# Patient Record
Sex: FEMALE | Race: WHITE
[De-identification: names, ages, dates, MRNs, and addresses within clinical notes are randomized per-mention and may not be internally consistent; named-entity substitution may affect disease eponyms.]

---

## 2018-01-15 ENCOUNTER — HOSPITAL ENCOUNTER (OUTPATIENT)
Dept: HOSPITAL 95 - LAB | Age: 53
Discharge: HOME | End: 2018-01-15
Payer: COMMERCIAL

## 2018-01-15 DIAGNOSIS — E11.49: Primary | ICD-10-CM

## 2018-01-15 LAB
ALBUMIN SERPL BCP-MCNC: 3.6 G/DL (ref 3.4–5)
ALBUMIN/GLOB SERPL: 0.9 {RATIO} (ref 0.8–1.8)
ALT SERPL W P-5'-P-CCNC: 24 U/L (ref 12–78)
ANION GAP SERPL CALCULATED.4IONS-SCNC: 9 MMOL/L (ref 6–16)
AST SERPL W P-5'-P-CCNC: 20 U/L (ref 12–37)
BASOPHILS # BLD AUTO: 0.03 K/MM3 (ref 0–0.23)
BASOPHILS NFR BLD AUTO: 1 % (ref 0–2)
BILIRUB SERPL-MCNC: 0.5 MG/DL (ref 0.1–1)
BUN SERPL-MCNC: 8 MG/DL (ref 8–24)
CALCIUM SERPL-MCNC: 9.4 MG/DL (ref 8.5–10.1)
CHLORIDE SERPL-SCNC: 104 MMOL/L (ref 98–108)
CHOLEST SERPL-MCNC: 208 MG/DL (ref 50–200)
CHOLEST/HDLC SERPL: 2.2 {RATIO}
CO2 SERPL-SCNC: 29 MMOL/L (ref 21–32)
CREAT SERPL-MCNC: 0.84 MG/DL (ref 0.4–1)
DEPRECATED RDW RBC AUTO: 45.9 FL (ref 35.1–46.3)
EOSINOPHIL # BLD AUTO: 0.36 K/MM3 (ref 0–0.68)
EOSINOPHIL NFR BLD AUTO: 9 % (ref 0–6)
ERYTHROCYTE [DISTWIDTH] IN BLOOD BY AUTOMATED COUNT: 13.6 % (ref 11.7–14.2)
GLOBULIN SER CALC-MCNC: 3.8 G/DL (ref 2.2–4)
GLUCOSE SERPL-MCNC: 142 MG/DL (ref 70–99)
HCT VFR BLD AUTO: 40.6 % (ref 33–51)
HDLC SERPL-MCNC: 93 MG/DL (ref 39–?)
HGB BLD-MCNC: 13.4 G/DL (ref 11.5–16)
IMM GRANULOCYTES # BLD AUTO: 0.01 K/MM3 (ref 0–0.1)
IMM GRANULOCYTES NFR BLD AUTO: 0 % (ref 0–1)
LDLC SERPL CALC-MCNC: 101 MG/DL (ref 0–110)
LDLC/HDLC SERPL: 1.1 {RATIO}
LYMPHOCYTES # BLD AUTO: 1.56 K/MM3 (ref 0.84–5.2)
LYMPHOCYTES NFR BLD AUTO: 38 % (ref 21–46)
MCHC RBC AUTO-ENTMCNC: 33 G/DL (ref 31.5–36.5)
MCV RBC AUTO: 92 FL (ref 80–100)
MONOCYTES # BLD AUTO: 0.22 K/MM3 (ref 0.16–1.47)
MONOCYTES NFR BLD AUTO: 5 % (ref 4–13)
NEUTROPHILS # BLD AUTO: 1.95 K/MM3 (ref 1.96–9.15)
NEUTROPHILS NFR BLD AUTO: 47 % (ref 41–73)
NRBC # BLD AUTO: 0 K/MM3 (ref 0–0.02)
NRBC BLD AUTO-RTO: 0 /100 WBC (ref 0–0.2)
PLATELET # BLD AUTO: 282 K/MM3 (ref 150–400)
POTASSIUM SERPL-SCNC: 4.7 MMOL/L (ref 3.5–5.5)
PROT SERPL-MCNC: 7.4 G/DL (ref 6.4–8.2)
SODIUM SERPL-SCNC: 142 MMOL/L (ref 136–145)
TRIGL SERPL-MCNC: 68 MG/DL (ref 30–160)
VLDLC SERPL CALC-MCNC: 13 MG/DL (ref 6–32)

## 2018-08-27 ENCOUNTER — HOSPITAL ENCOUNTER (OUTPATIENT)
Dept: HOSPITAL 95 - LAB SHORT | Age: 53
End: 2018-08-27
Attending: NURSE PRACTITIONER
Payer: COMMERCIAL

## 2018-08-27 DIAGNOSIS — L02.31: Primary | ICD-10-CM

## 2019-04-08 ENCOUNTER — HOSPITAL ENCOUNTER (OUTPATIENT)
Dept: HOSPITAL 95 - LAB | Age: 54
End: 2019-04-08
Attending: FAMILY MEDICINE
Payer: COMMERCIAL

## 2019-04-08 DIAGNOSIS — N39.0: Primary | ICD-10-CM

## 2019-04-08 LAB
GLUCOSE UR-MCNC: (no result) MG/DL
KETONES UR STRIP-MCNC: (no result) MG/DL
LEUKOCYTE ESTERASE UR QL STRIP: (no result)
PROT UR STRIP-MCNC: (no result) MG/DL
RBC #/AREA URNS HPF: (no result) /HPF (ref 0–2)
SP GR SPEC: 1.02 (ref 1–1.02)
UROBILINOGEN UR STRIP-MCNC: (no result) MG/DL
WBC #/AREA URNS HPF: (no result) /HPF (ref 0–5)

## 2019-07-25 ENCOUNTER — HOSPITAL ENCOUNTER (OUTPATIENT)
Dept: HOSPITAL 95 - ORSCMMR | Age: 54
Discharge: HOME | End: 2019-07-25
Attending: INTERNAL MEDICINE
Payer: COMMERCIAL

## 2019-07-25 VITALS — BODY MASS INDEX: 32.09 KG/M2 | WEIGHT: 197.31 LBS | HEIGHT: 65.75 IN

## 2019-07-25 DIAGNOSIS — G47.30: ICD-10-CM

## 2019-07-25 DIAGNOSIS — Z86.010: ICD-10-CM

## 2019-07-25 DIAGNOSIS — K64.8: ICD-10-CM

## 2019-07-25 DIAGNOSIS — I10: ICD-10-CM

## 2019-07-25 DIAGNOSIS — D12.5: ICD-10-CM

## 2019-07-25 DIAGNOSIS — F31.9: ICD-10-CM

## 2019-07-25 DIAGNOSIS — Z12.11: Primary | ICD-10-CM

## 2019-07-25 DIAGNOSIS — Z87.891: ICD-10-CM

## 2019-07-25 DIAGNOSIS — Z98.84: ICD-10-CM

## 2019-07-25 DIAGNOSIS — E78.5: ICD-10-CM

## 2019-07-25 DIAGNOSIS — K21.9: ICD-10-CM

## 2019-07-25 PROCEDURE — 0DBE8ZX EXCISION OF LARGE INTESTINE, VIA NATURAL OR ARTIFICIAL OPENING ENDOSCOPIC, DIAGNOSTIC: ICD-10-PCS | Performed by: INTERNAL MEDICINE

## 2019-07-25 PROCEDURE — 0DBN8ZX EXCISION OF SIGMOID COLON, VIA NATURAL OR ARTIFICIAL OPENING ENDOSCOPIC, DIAGNOSTIC: ICD-10-PCS | Performed by: INTERNAL MEDICINE

## 2019-07-25 NOTE — NUR
07/25/19 1237 Sumerlin,David C
PATIENT DETERMINED TO BE ASA APPROPRIATE FOR PROPOFOL SEDATION PRIOR
TO START OF PROCEDURE BY . 3-LEAD EKG REVIEWED WITH PHYSICIAN PRIOR
TO START OF PROCEDURE.PATIENT CONFIRMS NPO STATUS AND AGREES WITH
SCHEDULED PROCEDURE.History, Chart, Medications and Allergies reviewed
before start of procedure.MONITOR INTACT WITH CONTINUOUS PULSE
OXIMETRY AND INTERMITTENT BP.O2 VIA N/C INTACT THROUGHOUT
SEDATION/PROCEDURE.

## 2020-12-31 ENCOUNTER — HOSPITAL ENCOUNTER (OUTPATIENT)
Dept: HOSPITAL 95 - LAB SHORT | Age: 55
Discharge: HOME | End: 2020-12-31
Payer: COMMERCIAL

## 2020-12-31 DIAGNOSIS — N30.01: Primary | ICD-10-CM

## 2021-08-11 ENCOUNTER — HOSPITAL ENCOUNTER (OUTPATIENT)
Dept: HOSPITAL 95 - LAB | Age: 56
Discharge: HOME | End: 2021-08-11
Attending: PHYSICIAN ASSISTANT
Payer: COMMERCIAL

## 2021-08-11 DIAGNOSIS — N39.0: Primary | ICD-10-CM

## 2022-04-12 ENCOUNTER — HOSPITAL ENCOUNTER (OUTPATIENT)
Dept: HOSPITAL 95 - LAB SHORT | Age: 57
Discharge: HOME | End: 2022-04-12
Attending: FAMILY MEDICINE
Payer: COMMERCIAL

## 2022-04-12 DIAGNOSIS — N39.0: Primary | ICD-10-CM

## 2022-06-07 ENCOUNTER — HOSPITAL ENCOUNTER (OUTPATIENT)
Dept: HOSPITAL 95 - LAB | Age: 57
Discharge: HOME | End: 2022-06-07
Attending: FAMILY MEDICINE
Payer: COMMERCIAL

## 2022-06-07 DIAGNOSIS — Z01.812: Primary | ICD-10-CM

## 2022-08-18 NOTE — NUR
[address]  [place], [place]  Ph: [phone]    Fax: [phone]    Discharge Summary  2-15    Patient name: Alton Chandra  : 1967  Provider#: [id number]  Referral source: Akbar Caicedo MD      Medical/Treatment Diagnosis: Pain in right knee [M25.561]  Other chronic pain [G89.29]  Presence of right artificial knee joint [Z96.651]  Iliotibial band tendonitis of right side [M76.31]     Prior Hospitalization: see medical history     Comorbidities: See Plan of Care  Prior Level of Function:See Plan of Care  Medications: Verified on Patient Summary List    Start of Care: 4/15/2022      Onset Date:2022   Visits from Start of Care: 18 Missed Visits: 6  Reporting Period : 4/15/2022 to 2022      ASSESSMENT/SUMMARY OF CARE:   Patient has not returned for treatment since 2022. Patient was called secondary to absence and pt states she was DC by MD.  Patient educated to return to Physician for new prescription if the problem persists so she can return to physical therapy. Patient is now formally discharged from physical therapy due to lack of attendance. Goals have not been formally assessed due to patient's discontinuance. Progress towards goals / Updated goals:  Short Term Goals: To be accomplished in 6 treatments. 1.   Patient will demonstrate independence and compliance with HEP in order to assist with carryover from PT services. Met  2. Patient will be able to work in garden without reports of difficulty to increase functional mobility. Met  3. Patient will be able to play with her grandchildren outside without reports of difficulty to increase functional mobility. Partially Met (haven't had the opportunity)  4. Patient will be able to complete all ADLs without pain greater than or equal to 0/10 to increase functional mobility. Met  5.    Patient will increase right knee flexion arom to 75 in order to improve Discharge instructions reviewed with patient. Patient verbalizes understanding.
Copy given to patient to take home.
Discharged via wheelchair to private car for ride home. gait.  Partially Met  6. Patient will be able to squat and  object off the floor with pain level of 5/10 with no difficulty. (New Goal 5/31/22)     Long Term Goals: To be accomplished in 12  treatments. 1.   Patient will be able to complete all household chores without pain greater than or equal to 0/10 to increase functional mobility. Met  2. Patient will increase right knee flexion arom to 80 in order to improve gait. Partially Met  3. Patient will increase right knee extension arom to -15 in order to improve gait pattern. Partially Met  4. Patient will be able to squat and  object off the floor with pain level of 5/10 with no difficulty. ( New Goal 5/31/22)      RECOMMENDATIONS:  [x]Discontinue therapy: []Patient has reached or is progressing toward set goals      [x]Patient is non-compliant or has abdicated      []Due to lack of appreciable progress towards set goals      []Other      Alex Yoon, PT, DPT  8/18/2022

## 2023-03-27 ENCOUNTER — HOSPITAL ENCOUNTER (EMERGENCY)
Dept: HOSPITAL 95 - ER | Age: 58
Discharge: HOME | End: 2023-03-27
Payer: COMMERCIAL

## 2023-03-27 VITALS — HEIGHT: 66 IN | WEIGHT: 180.01 LBS | BODY MASS INDEX: 28.93 KG/M2

## 2023-03-27 DIAGNOSIS — Z79.899: ICD-10-CM

## 2023-03-27 DIAGNOSIS — Z88.8: ICD-10-CM

## 2023-03-27 DIAGNOSIS — R07.9: ICD-10-CM

## 2023-03-27 DIAGNOSIS — E11.9: ICD-10-CM

## 2023-03-27 DIAGNOSIS — Z87.891: ICD-10-CM

## 2023-03-27 DIAGNOSIS — Z91.040: ICD-10-CM

## 2023-03-27 DIAGNOSIS — Z79.84: ICD-10-CM

## 2023-03-27 DIAGNOSIS — I10: ICD-10-CM

## 2023-03-27 DIAGNOSIS — R10.13: ICD-10-CM

## 2023-03-27 DIAGNOSIS — R10.10: Primary | ICD-10-CM

## 2023-03-27 LAB
ALBUMIN SERPL BCP-MCNC: 4.1 G/DL (ref 3.4–5)
ALBUMIN/GLOB SERPL: 1 {RATIO} (ref 0.8–1.8)
ALT SERPL W P-5'-P-CCNC: 24 U/L (ref 12–78)
ANION GAP SERPL CALCULATED.4IONS-SCNC: 10 MMOL/L (ref 6–16)
AST SERPL W P-5'-P-CCNC: 42 U/L (ref 12–37)
BASOPHILS # BLD AUTO: 0.07 K/MM3 (ref 0–0.23)
BASOPHILS NFR BLD AUTO: 1 % (ref 0–2)
BILIRUB SERPL-MCNC: 0.7 MG/DL (ref 0.1–1)
BUN SERPL-MCNC: 15 MG/DL (ref 8–24)
CALCIUM SERPL-MCNC: 9.5 MG/DL (ref 8.5–10.1)
CHLORIDE SERPL-SCNC: 105 MMOL/L (ref 98–108)
CO2 SERPL-SCNC: 19 MMOL/L (ref 21–32)
CREAT SERPL-MCNC: 0.65 MG/DL (ref 0.4–1)
DEPRECATED RDW RBC AUTO: 41.1 FL (ref 35.1–46.3)
EOSINOPHIL # BLD AUTO: 0.08 K/MM3 (ref 0–0.68)
EOSINOPHIL NFR BLD AUTO: 1 % (ref 0–6)
ERYTHROCYTE [DISTWIDTH] IN BLOOD BY AUTOMATED COUNT: 13.2 % (ref 11.7–14.2)
GLOBULIN SER CALC-MCNC: 4.3 G/DL (ref 2.2–4)
GLUCOSE SERPL-MCNC: 80 MG/DL (ref 70–99)
HCT VFR BLD AUTO: 43.3 % (ref 33–51)
HGB BLD-MCNC: 14.7 G/DL (ref 11.5–16)
IMM GRANULOCYTES # BLD AUTO: 0.02 K/MM3 (ref 0–0.1)
IMM GRANULOCYTES NFR BLD AUTO: 0 % (ref 0–1)
LYMPHOCYTES # BLD AUTO: 1.59 K/MM3 (ref 0.84–5.2)
LYMPHOCYTES NFR BLD AUTO: 19 % (ref 21–46)
MCHC RBC AUTO-ENTMCNC: 33.9 G/DL (ref 31.5–36.5)
MCV RBC AUTO: 87 FL (ref 80–100)
MONOCYTES # BLD AUTO: 0.54 K/MM3 (ref 0.16–1.47)
MONOCYTES NFR BLD AUTO: 7 % (ref 4–13)
NEUTROPHILS # BLD AUTO: 6.04 K/MM3 (ref 1.96–9.15)
NEUTROPHILS NFR BLD AUTO: 72 % (ref 41–73)
NRBC # BLD AUTO: 0 K/MM3 (ref 0–0.02)
NRBC BLD AUTO-RTO: 0 /100 WBC (ref 0–0.2)
PLATELET # BLD AUTO: 399 K/MM3 (ref 150–400)
POTASSIUM SERPL-SCNC: 4.4 MMOL/L (ref 3.5–5.5)
PROT SERPL-MCNC: 8.4 G/DL (ref 6.4–8.2)
SODIUM SERPL-SCNC: 134 MMOL/L (ref 136–145)

## 2023-03-28 ENCOUNTER — HOSPITAL ENCOUNTER (OUTPATIENT)
Dept: HOSPITAL 95 - ER | Age: 58
Setting detail: OBSERVATION
LOS: 1 days | Discharge: PSYCHIATRIC OR SUBSTANCE USE FACILITY | End: 2023-03-29
Attending: STUDENT IN AN ORGANIZED HEALTH CARE EDUCATION/TRAINING PROGRAM | Admitting: STUDENT IN AN ORGANIZED HEALTH CARE EDUCATION/TRAINING PROGRAM
Payer: COMMERCIAL

## 2023-03-28 VITALS — WEIGHT: 184 LBS | HEIGHT: 66 IN | BODY MASS INDEX: 29.57 KG/M2

## 2023-03-28 DIAGNOSIS — Z20.822: ICD-10-CM

## 2023-03-28 DIAGNOSIS — J44.9: ICD-10-CM

## 2023-03-28 DIAGNOSIS — Z88.8: ICD-10-CM

## 2023-03-28 DIAGNOSIS — Z91.040: ICD-10-CM

## 2023-03-28 DIAGNOSIS — Z88.1: ICD-10-CM

## 2023-03-28 DIAGNOSIS — F25.9: Primary | ICD-10-CM

## 2023-03-28 DIAGNOSIS — G47.33: ICD-10-CM

## 2023-03-28 LAB
FLUAV RNA SPEC QL NAA+PROBE: NEGATIVE
FLUBV RNA SPEC QL NAA+PROBE: NEGATIVE
RSV RNA SPEC QL NAA+PROBE: NEGATIVE
SARS-COV-2 RNA RESP QL NAA+PROBE: NEGATIVE

## 2023-03-28 PROCEDURE — A9270 NON-COVERED ITEM OR SERVICE: HCPCS

## 2023-03-29 LAB
KETONES UR STRIP-MCNC: (no result) MG/DL
SP GR SPEC: 1.02 (ref 1–1.02)
TSH SERPL DL<=0.005 MIU/L-ACNC: 3.6 UIU/ML (ref 0.36–4.8)
UROBILINOGEN UR STRIP-MCNC: (no result) MG/DL

## 2023-04-20 ENCOUNTER — HOSPITAL ENCOUNTER (OUTPATIENT)
Dept: HOSPITAL 95 - LAB | Age: 58
Discharge: HOME | End: 2023-04-20
Attending: NURSE PRACTITIONER
Payer: COMMERCIAL

## 2023-04-20 DIAGNOSIS — Z91.89: ICD-10-CM

## 2023-04-20 DIAGNOSIS — Z01.419: Primary | ICD-10-CM

## 2023-04-20 DIAGNOSIS — Z85.41: ICD-10-CM

## 2023-04-20 DIAGNOSIS — Z11.51: ICD-10-CM

## 2023-04-20 DIAGNOSIS — Z90.710: ICD-10-CM

## 2023-04-20 DIAGNOSIS — Z12.72: ICD-10-CM

## 2023-04-20 PROCEDURE — G0145 SCR C/V CYTO,THINLAYER,RESCR: HCPCS

## 2023-04-26 LAB — OTHER STN SPEC: (no result)

## 2023-08-05 ENCOUNTER — HOSPITAL ENCOUNTER (EMERGENCY)
Dept: HOSPITAL 95 - ER | Age: 58
Discharge: HOME | End: 2023-08-05
Payer: COMMERCIAL

## 2023-08-05 VITALS — SYSTOLIC BLOOD PRESSURE: 151 MMHG | DIASTOLIC BLOOD PRESSURE: 80 MMHG

## 2023-08-05 VITALS — WEIGHT: 189.99 LBS | BODY MASS INDEX: 30.53 KG/M2 | HEIGHT: 66 IN

## 2023-08-05 DIAGNOSIS — M23.92: ICD-10-CM

## 2023-08-05 DIAGNOSIS — Z88.8: ICD-10-CM

## 2023-08-05 DIAGNOSIS — Z91.040: ICD-10-CM

## 2023-08-05 DIAGNOSIS — E11.9: ICD-10-CM

## 2023-08-05 DIAGNOSIS — I10: ICD-10-CM

## 2023-08-05 DIAGNOSIS — Z79.84: ICD-10-CM

## 2023-08-05 DIAGNOSIS — G47.33: ICD-10-CM

## 2023-08-05 DIAGNOSIS — Z87.891: ICD-10-CM

## 2023-08-05 DIAGNOSIS — S80.02XA: ICD-10-CM

## 2023-08-05 DIAGNOSIS — Z79.899: ICD-10-CM
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
## 2023-08-05 DIAGNOSIS — J44.9: ICD-10-CM

## 2023-08-05 DIAGNOSIS — S89.91XA: ICD-10-CM

## 2023-08-05 DIAGNOSIS — S76.012A: Primary | ICD-10-CM

## 2023-08-05 PROCEDURE — A9270 NON-COVERED ITEM OR SERVICE: HCPCS
